# Patient Record
Sex: FEMALE | Race: WHITE
[De-identification: names, ages, dates, MRNs, and addresses within clinical notes are randomized per-mention and may not be internally consistent; named-entity substitution may affect disease eponyms.]

---

## 2022-08-15 ENCOUNTER — HOSPITAL ENCOUNTER (INPATIENT)
Dept: HOSPITAL 19 - ICU | Age: 32
LOS: 1 days | Discharge: HOME | DRG: 917 | End: 2022-08-16
Attending: INTERNAL MEDICINE | Admitting: INTERNAL MEDICINE
Payer: SELF-PAY

## 2022-08-15 VITALS — OXYGEN SATURATION: 100 %

## 2022-08-15 VITALS — SYSTOLIC BLOOD PRESSURE: 98 MMHG | OXYGEN SATURATION: 99 % | HEART RATE: 61 BPM | DIASTOLIC BLOOD PRESSURE: 60 MMHG

## 2022-08-15 VITALS — OXYGEN SATURATION: 99 %

## 2022-08-15 VITALS — OXYGEN SATURATION: 97 %

## 2022-08-15 VITALS — HEART RATE: 53 BPM | SYSTOLIC BLOOD PRESSURE: 95 MMHG | OXYGEN SATURATION: 100 % | DIASTOLIC BLOOD PRESSURE: 93 MMHG

## 2022-08-15 VITALS — HEART RATE: 63 BPM | TEMPERATURE: 97.3 F | SYSTOLIC BLOOD PRESSURE: 98 MMHG | DIASTOLIC BLOOD PRESSURE: 69 MMHG

## 2022-08-15 VITALS — OXYGEN SATURATION: 98 %

## 2022-08-15 VITALS
TEMPERATURE: 97.2 F | HEART RATE: 59 BPM | SYSTOLIC BLOOD PRESSURE: 89 MMHG | OXYGEN SATURATION: 100 % | DIASTOLIC BLOOD PRESSURE: 63 MMHG

## 2022-08-15 VITALS — OXYGEN SATURATION: 93 %

## 2022-08-15 VITALS — SYSTOLIC BLOOD PRESSURE: 92 MMHG | DIASTOLIC BLOOD PRESSURE: 60 MMHG

## 2022-08-15 VITALS — OXYGEN SATURATION: 96 %

## 2022-08-15 VITALS — OXYGEN SATURATION: 85 %

## 2022-08-15 VITALS — HEART RATE: 60 BPM | SYSTOLIC BLOOD PRESSURE: 92 MMHG | OXYGEN SATURATION: 100 % | DIASTOLIC BLOOD PRESSURE: 60 MMHG

## 2022-08-15 VITALS — OXYGEN SATURATION: 92 %

## 2022-08-15 VITALS — WEIGHT: 110.67 LBS | HEIGHT: 55 IN

## 2022-08-15 DIAGNOSIS — G92.8: ICD-10-CM

## 2022-08-15 DIAGNOSIS — T43.622A: Primary | ICD-10-CM
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
## 2022-08-15 DIAGNOSIS — F15.10: ICD-10-CM

## 2022-08-15 NOTE — NUR
PT RESTING IN BED ASLEEP. MOANS, CRIES AND THRASHES AROUND WHEN STAFF PROVIDES
CARES (EKG, HOOK UP IVF). PT DOESN'T OPEN EYES OR COMMUNICATE WITH STAFF,
DOESNT ANSWER ANY QUESTIONS.

## 2022-08-15 NOTE — NUR
PT ARRIVED TO ICU 4 VIA EMS FROM [facility] @ 1883. PT THRASHES AROUND WHEN
TOUCHED, MOANS AND CRIES BUT DOES NOT OPEN EYES OR ANSWER QUESTIONS. UNABLE TO
COMPLETE ADMISSION QUESTIONS AT THIS TIME. PT HAS LEBRON IN PLACE DRAINING
CLEAR YELLOW URINE TO DD. RAC IV THAT FLUSHES W/O ISSUES. HOSPITALIST AWARE OF
PT ARRIVAL & HAS SEEN PT. NO FURTHER NEEDS AT THIS TIME. PT RESTING IN BED.

## 2022-08-15 NOTE — NUR
BP running 80's-90's systolic with MAPS 65-75.  Dr. Parikh notified. Will
administer 1 liter bolus of NS.

## 2022-08-16 VITALS
SYSTOLIC BLOOD PRESSURE: 108 MMHG | OXYGEN SATURATION: 99 % | HEART RATE: 53 BPM | TEMPERATURE: 97 F | DIASTOLIC BLOOD PRESSURE: 73 MMHG

## 2022-08-16 VITALS — OXYGEN SATURATION: 100 %

## 2022-08-16 VITALS — OXYGEN SATURATION: 99 %

## 2022-08-16 VITALS
HEART RATE: 60 BPM | DIASTOLIC BLOOD PRESSURE: 74 MMHG | TEMPERATURE: 97.8 F | SYSTOLIC BLOOD PRESSURE: 105 MMHG | OXYGEN SATURATION: 100 %

## 2022-08-16 VITALS
HEART RATE: 56 BPM | TEMPERATURE: 97.3 F | DIASTOLIC BLOOD PRESSURE: 73 MMHG | OXYGEN SATURATION: 97 % | SYSTOLIC BLOOD PRESSURE: 108 MMHG

## 2022-08-16 VITALS — OXYGEN SATURATION: 98 %

## 2022-08-16 VITALS — DIASTOLIC BLOOD PRESSURE: 70 MMHG | HEART RATE: 54 BPM | OXYGEN SATURATION: 99 % | SYSTOLIC BLOOD PRESSURE: 102 MMHG

## 2022-08-16 VITALS — SYSTOLIC BLOOD PRESSURE: 100 MMHG | DIASTOLIC BLOOD PRESSURE: 72 MMHG | HEART RATE: 56 BPM

## 2022-08-16 VITALS — SYSTOLIC BLOOD PRESSURE: 104 MMHG | DIASTOLIC BLOOD PRESSURE: 69 MMHG | OXYGEN SATURATION: 100 % | HEART RATE: 50 BPM

## 2022-08-16 VITALS — OXYGEN SATURATION: 99 % | SYSTOLIC BLOOD PRESSURE: 106 MMHG | HEART RATE: 54 BPM | DIASTOLIC BLOOD PRESSURE: 72 MMHG

## 2022-08-16 VITALS — HEART RATE: 89 BPM | SYSTOLIC BLOOD PRESSURE: 104 MMHG | DIASTOLIC BLOOD PRESSURE: 71 MMHG

## 2022-08-16 VITALS — OXYGEN SATURATION: 97 %

## 2022-08-16 VITALS
SYSTOLIC BLOOD PRESSURE: 106 MMHG | TEMPERATURE: 97 F | HEART RATE: 59 BPM | OXYGEN SATURATION: 99 % | DIASTOLIC BLOOD PRESSURE: 68 MMHG

## 2022-08-16 VITALS — OXYGEN SATURATION: 96 %

## 2022-08-16 VITALS — OXYGEN SATURATION: 99 % | SYSTOLIC BLOOD PRESSURE: 108 MMHG | HEART RATE: 56 BPM | DIASTOLIC BLOOD PRESSURE: 73 MMHG

## 2022-08-16 VITALS — SYSTOLIC BLOOD PRESSURE: 107 MMHG | OXYGEN SATURATION: 100 % | HEART RATE: 58 BPM | DIASTOLIC BLOOD PRESSURE: 73 MMHG

## 2022-08-16 VITALS
HEART RATE: 51 BPM | DIASTOLIC BLOOD PRESSURE: 84 MMHG | TEMPERATURE: 97.7 F | SYSTOLIC BLOOD PRESSURE: 102 MMHG | OXYGEN SATURATION: 94 %

## 2022-08-16 VITALS — SYSTOLIC BLOOD PRESSURE: 102 MMHG | OXYGEN SATURATION: 100 % | HEART RATE: 47 BPM | DIASTOLIC BLOOD PRESSURE: 75 MMHG

## 2022-08-16 VITALS — HEART RATE: 58 BPM | SYSTOLIC BLOOD PRESSURE: 108 MMHG | DIASTOLIC BLOOD PRESSURE: 73 MMHG

## 2022-08-16 VITALS — OXYGEN SATURATION: 93 %

## 2022-08-16 VITALS — HEART RATE: 56 BPM | SYSTOLIC BLOOD PRESSURE: 108 MMHG | OXYGEN SATURATION: 100 % | DIASTOLIC BLOOD PRESSURE: 73 MMHG

## 2022-08-16 VITALS — OXYGEN SATURATION: 95 %

## 2022-08-16 VITALS — HEART RATE: 57 BPM

## 2022-08-16 VITALS — OXYGEN SATURATION: 92 %

## 2022-08-16 LAB
ANION GAP SERPL CALC-SCNC: 6 MMOL/L (ref 7–16)
BASOPHILS # BLD: 0 K/MM3 (ref 0–0.2)
BASOPHILS NFR BLD AUTO: 0.3 % (ref 0–2)
BUN SERPL-MCNC: 7 MG/DL (ref 7–19)
CALCIUM SERPL-MCNC: 8.1 MG/DL (ref 8.4–10.2)
CHLORIDE SERPL-SCNC: 116 MMOL/L (ref 98–107)
CO2 SERPL-SCNC: 21 MMOL/L (ref 22–29)
CREAT SERPL-SCNC: 0.62 MG/DL (ref 0.57–1.11)
EOSINOPHIL # BLD: 0.1 K/MM3 (ref 0–0.7)
EOSINOPHIL NFR BLD: 2.1 % (ref 0–4)
ERYTHROCYTE [DISTWIDTH] IN BLOOD BY AUTOMATED COUNT: 12.1 % (ref 11.5–14.5)
GLUCOSE SERPL-MCNC: 84 MG/DL (ref 70–99)
GRANULOCYTES # BLD AUTO: 37.1 % (ref 42.2–75.2)
HCT VFR BLD AUTO: 34.7 % (ref 37–47)
HGB BLD-MCNC: 11.5 G/DL (ref 12.5–16)
LYMPHOCYTES # BLD: 3.3 K/MM3 (ref 1.2–3.4)
LYMPHOCYTES NFR BLD: 51.8 % (ref 20–51)
MAGNESIUM SERPL-MCNC: 1.9 MG/DL (ref 1.6–2.6)
MCH RBC QN AUTO: 33 PG (ref 27–31)
MCHC RBC AUTO-ENTMCNC: 33 G/DL (ref 33–37)
MCV RBC AUTO: 100 FL (ref 80–100)
MONOCYTES # BLD: 0.5 K/MM3 (ref 0.1–0.6)
MONOCYTES NFR BLD AUTO: 8.5 % (ref 1.7–9.3)
NEUTROPHILS # BLD: 2.3 K/MM3 (ref 1.4–6.5)
PLATELET # BLD AUTO: 307 K/MM3 (ref 130–400)
PMV BLD AUTO: 9.6 FL (ref 7.4–10.4)
POTASSIUM SERPL-SCNC: 3.7 MMOL/L (ref 3.5–4.5)
RBC # BLD AUTO: 3.46 M/MM3 (ref 4.1–5.3)
SODIUM SERPL-SCNC: 143 MMOL/L (ref 136–145)

## 2022-08-16 NOTE — NUR
met with patient and confirmed that her 6 year old daughter is
currently staying with her brother in [place]  Patient will be screened
by [facility] due to suicide attempt when medically stable.  Patient
was a transfer from [facility].  Patient states she lives in
[place], [place] with her boyfriend.  Worker filed a [employer] report # [id number].

## 2022-08-16 NOTE — NUR
PT CLEARED BY [facility]. PT REFUSED OUT PATIENT SERVICES. DR. RODRIGUES IS AWARE. PT
IS CLEARED MEDICALLY TO DISCHARGE.